# Patient Record
Sex: FEMALE
[De-identification: names, ages, dates, MRNs, and addresses within clinical notes are randomized per-mention and may not be internally consistent; named-entity substitution may affect disease eponyms.]

---

## 2022-04-11 ENCOUNTER — APPOINTMENT (OUTPATIENT)
Dept: ORTHOPEDIC SURGERY | Facility: CLINIC | Age: 52
End: 2022-04-11
Payer: OTHER MISCELLANEOUS

## 2022-04-11 VITALS — BODY MASS INDEX: 29.45 KG/M2 | HEIGHT: 61 IN | WEIGHT: 156 LBS

## 2022-04-11 DIAGNOSIS — M17.11 UNILATERAL PRIMARY OSTEOARTHRITIS, RIGHT KNEE: ICD-10-CM

## 2022-04-11 PROCEDURE — 99455 WORK RELATED DISABILITY EXAM: CPT

## 2022-04-11 PROCEDURE — 99072 ADDL SUPL MATRL&STAF TM PHE: CPT

## 2022-04-14 NOTE — WORK
[Has the patient reached Maximum Medical Improvement? If yes, indicate date___] : Yes, on [unfilled] [Is there permanent partial impairment?] : Yes [Right] : right [Yes] : Yes [At the pre-injury job] : At the pre-injury job [] : Frequently [Could this patient perform any work activities with or without restrictions? Explain below.] : Yes [FreeTextEntry6] : right knee: [FreeTextEntry7] : knee [FreeTextEntry8] : 0-130 [de-identified] : 0-140 [de-identified] : prior claim with 6% [FreeTextEntry5] : 11% [de-identified] : prior award of 6%.  lack of full flexion as compared to contralateral side adds 5% for a total of 11% [Has the patient had an injury/illness since the date of injury which impacts residual functional capacity?] : No [Would the patient benefit from vocational rehabilitation? If Yes, explain below.] :  No [de-identified] : none

## 2022-04-14 NOTE — HISTORY OF PRESENT ILLNESS
[Gradual] : gradual [0] : 0 [7] : 7 [Stabbing] : stabbing [Intermittent] : intermittent [de-identified] : 04/11/22: Here to f/up right knee.  Here for [facility] - prior 2017 award of 6% [facility]. \par 9/9/21: Here to f/up right knee. Continued right knee pain. Reports that she obtained relief from the series of Orthovisc injections completed in March, she interested in a repeat series of injections. \par 07/22/21: Here to review MRI results and f/up right knee\par 07/06/21: here to f/up right knee. Reports some improvement with pain intensity. Reports more episodes of ita giving way. Medial pain when laying in bed a night. \par 6/8/21: here to f/up right knee. Had been doing well seeing gradual improvement. Reports yesterday she was getting out of a car and the knee buckled. Relief with diclofenac gel. \par 4/27/2021: Pt states that her right knee pain has improved s/p Orthovisc #4. Pt still has pain with prolonged ambulation and with wearing heels. \par 3/2/21: here for f/up and orthovisc #4. tolerated prior injections well. no issues\par 02/23/21: here for f/up and for orthovisc #3\par 2/16/21: Follow up R knee. Here for orthovisc #2.\par \par 02/09/21: here today for f/up for right knee. states she has been doing okay in terms of pain as she has been on vacation. now that she is back to work she is feel ache. pain at night and uses pillow in-between legs. \par 12/22/20: here for f/up for right wrist and right knee. still with pain in wrist and knee despite conservative managemetn\par \par 11/10/2020: here for f/up for right wrist and right knee. was see by wrist specialist. given injection and told to take Tylenol. relief with injection. \par \par 09/29/2020: here today for f/up and mri review of knee and wrist. stil with pain. has been working with pt. currently working full duty.\par \par 09/08/2020: here today for f/up for wirst and knee. has continued working recently with increased pain. has completed 4 sessions of pt. had csi in knee for approx. 2 days of relief. \par \par 07/28/2020: here today for f/up of wrist pain and right knee pain. states pain has improved. still with knee pain on bending. unable to wear heels. back to work on limited duty. taking nsaids and using ice\par \par 07/14/2020: Ms. Elissa Wooten, a 49-year-old female, presents today for right wrist and right knee pain. had a fall at work on 7/1/2020. was walking and shoe got stuck on step. went to  and xr done and dx with contusions. given off work. told to see ortho. denies any prior knee or wrist issues. denies any n/t in the wrist.\par \par  [FreeTextEntry1] : rt knee  [FreeTextEntry5] :  KENDRA BOND is a 51 year female who is here today to follow up on her rt knee pain. She is doing worst since last visit.\par

## 2022-04-14 NOTE — PHYSICAL EXAM
[Right] : right knee [Left] : left knee [NL (140)] : flexion 140 degrees [NL (0)] : extension 0 degrees [] : negative Valgus instability [TWNoteComboBox7] : flexion 130 degrees [de-identified] : extension 0 degrees